# Patient Record
Sex: MALE | Race: WHITE | NOT HISPANIC OR LATINO | Employment: UNEMPLOYED | URBAN - METROPOLITAN AREA
[De-identification: names, ages, dates, MRNs, and addresses within clinical notes are randomized per-mention and may not be internally consistent; named-entity substitution may affect disease eponyms.]

---

## 2022-03-03 ENCOUNTER — TELEPHONE (OUTPATIENT)
Dept: PSYCHIATRY | Facility: CLINIC | Age: 32
End: 2022-03-03

## 2022-09-27 ENCOUNTER — OFFICE VISIT (OUTPATIENT)
Dept: PSYCHIATRY | Facility: CLINIC | Age: 32
End: 2022-09-27
Payer: COMMERCIAL

## 2022-09-27 DIAGNOSIS — F41.1 GENERALIZED ANXIETY DISORDER: Primary | ICD-10-CM

## 2022-10-12 ENCOUNTER — VIRTUAL VISIT (OUTPATIENT)
Dept: PSYCHIATRY | Facility: CLINIC | Age: 32
End: 2022-10-12
Payer: COMMERCIAL

## 2022-10-12 VITALS — WEIGHT: 150 LBS

## 2022-10-12 DIAGNOSIS — F33.2 MDD (MAJOR DEPRESSIVE DISORDER), RECURRENT EPISODE, SEVERE (H): ICD-10-CM

## 2022-10-12 DIAGNOSIS — F41.1 GENERALIZED ANXIETY DISORDER: Primary | ICD-10-CM

## 2022-10-12 RX ORDER — CLONAZEPAM 0.5 MG/1
TABLET ORAL PRN
COMMUNITY
Start: 2022-01-31

## 2022-10-12 RX ORDER — FLUOXETINE 40 MG/1
80 CAPSULE ORAL DAILY
COMMUNITY
Start: 2022-06-09

## 2022-10-12 ASSESSMENT — PATIENT HEALTH QUESTIONNAIRE - PHQ9
SUM OF ALL RESPONSES TO PHQ QUESTIONS 1-9: 8
SUM OF ALL RESPONSES TO PHQ QUESTIONS 1-9: 8
10. IF YOU CHECKED OFF ANY PROBLEMS, HOW DIFFICULT HAVE THESE PROBLEMS MADE IT FOR YOU TO DO YOUR WORK, TAKE CARE OF THINGS AT HOME, OR GET ALONG WITH OTHER PEOPLE: SOMEWHAT DIFFICULT

## 2022-10-12 ASSESSMENT — PAIN SCALES - GENERAL: PAINLEVEL: NO PAIN (0)

## 2022-10-12 NOTE — PROGRESS NOTES
"[facility] Treatment Resistant Depression Program  Diagnostic Assessment  A part of the [facility] Psychiatry Mood Disorders Program    Oscar Dunn MRN# [id number]   Age: 32 year old YOB: 1990     Date of Evaluation: 9/27/22  Start Time: 1:00; End Time: 2:15           Care Team     PCP- Marcus Reid  Specialty Providers- no  Therapist- Cassie Mejia  Psychiatric Med Management Provider- Montrell Benson at [facility]  Other Mental Health Providers- no    Referred by:  Self  Referred for evaluation of:  OCD         Contributors to the Assessment     Chart Reviewed.   Interview completed with Oscar Dunn.  Releases of information signed?  no  Collateral information obtained? no           Chief Complaint     \"most decisions are made with and by OCD\"         History of Present Illness      Oscar Dunn is a 32 year old male who goes by Oscar and uses he, him, his pronouns.    Oscar reports OCD symptoms starting at age 7 or 8 with rituals related to health. He notes that this started \"after a bad flu.\"    Oscar reports some symptoms of depression that are related to severity of OCD symptoms.    Current psychosocial stressors discussed include not working right now after a contract ended     Discussed other mental health concerns apart from depression, including anxiety, depression    Psych critical item history includes occasional passive SI         Psychiatric Review of Systems (Completed M.I.N.I. Version 7.0.2     A. DEPRESSION  Past 2 Weeks:  none    Past Episode:  low mood nearly every day, anhedonia most of the time, difficulties with sleep, psychomotor changes (agitation), low energy, worthlessness and/or guilt, difficulty concentrating, thinking or making decisions and suicidal ideation without plan, without intent    B. SUICIDALITY: Current: No, risk Low  -reports 0 lifetime suicide attempts  -reports 0% in response to \"How likely are to you to try to kill yourself within the next 3 months on a " "scale from 0-100%?\"  -denies current SI, denies plan and denies intent  -denies current SIB/Self Injurious Behavior    C. NOAH/HYPOMANIA  Current Episode:  none    Past Episode:  none    D. PANIC:  Oscar reports he always feels some level of anxiety and notes that in high school and college the severity ramped up to panic-level with panic attacks    E. AGORAPHOBIA:  marked fear/anxiety in traveling by bus, train, car or using public transportation because of fear that escape might be difficult or help might not be available;, at a level out of proportion to the actual danger posed, lasting 6 months or more, causing clinically significant distress or impairement in social, occupation, or other important areas of functioning  and Oscar notes that being in unfamiliar places is hard because they are \"not a safe spot\"    F. SOCIAL ANXIETY:  marked fear/anxiety in participating in small groups, attending parties and any situation where he is seeing someone he hasn't seen for a while or someone he is less familiar with than close friends and family. Oscar identifies a great deal of anticipatory anxiety for most social situations out of fear that he/she will act in a way or show anxiety symptoms that will be negatively evaluated, almost always, with active avoidance, a  or are endured with intense anxiety/fear, at a level out of proportion to the actual danger posed, lasting 6 months or more and causing clinically significant distress or impairement in social, occupation, or other important areas of functioning     G. OBSESSIVE-COMPULSIVE:  see YBOCS scores in flowseets. Oscar didn't no complete the symptom list as he was concerned about being triggered by content; however, he notes that OCD sx are moslty centered around health (vomitting, blood borne illness), contamination, and getting into trouble for something. He also notes some harm OCD and \"flavor of the week\" obsessions as well    H. TRAUMA:  experienced traumatic " "event, persistent avoidance of recollections of trauma and hypervigilence in all circumstances. Oscar reports that he has \"awlays been hypervigilant\" and is always scanning where he is for triggers and \"things to avoid\"    I. ALCOHOL & J. NON-ALCOHOL:  See below    K. PSYCHOSIS:   none    L-M. EATING DISORDER: did not assess due to time    N. GENERALIZED ANXIETY:  excessive anxiety or worry about several routine things, most days, with difficulty controlling worry, feel restless, keyed up or on edge, muscle tension, easily tired, weak or exhausted, difficulty concentrating or mind goes blank, irritability and difficulty sleeping    O. RULE OUT MEDICAL, ORGANIC OR DRUG CAUSES FOR ALL DISORDERS  During any current disorder or past mood episode, patient reports:  A. Substance use or withdrawal: No  B. Medical illness: Oscar reports that his OCD symptoms started \"after a bad flu\"    P. ANTISOCIAL PERSONALITY:  none     Other Cluster B Traits Identified (not formally assessed):  none discussed    SUBSTANCE USE HISTORY                                                                 RECENT SUBSTANCE USE:     TOBACCO- none     CAFFEINE- 1-2 cups/day of coffee   ALCOHOL- 4-5 drink(s) per week     CANNABIS- none            OTHER ILLICIT DRUGS- none    Past Use-   TOBACCO- none     CAFFEINE- 1-2 cups/day of coffee   ALCOHOL- 4-5 drink(s) per week     CANNABIS- none            OTHER ILLICIT DRUGS- none    CD Treatment Hx: No  Medical Consequences (eg HIV/Hepatitis)- No  Legal Consequences- No     PSYCHIATRIC HISTORY     Past diagnoses: OCD, GD    Past medication trials: multiple trials    Hospitalizations: none     Commitment: No, Current Navarro order: No    ECT trials: No    TMS trials:  Yes - recently completed a TMS course at [facility] and reports improvement in OCD symptoms       Ketamine:  No    Suicide attempts: No    Self-injurious behavior: No    Violent behavior: No    Outpatient Programs & Services [Psychotherapy, DBT, Day " "Treatment, Eating Disorder Tx etc]:   Current:  Medication management and Outpatient individual psychotherapy    Past:  Medication management, Outpatient individual psychotherapy and IOP at [facility] (3/202) - virtual, approx one month, PHP at [facility] (fall 2021) - in person for one week and then stopped because he was \"not in the right mindset\" and was too distracted by other participants    SOCIAL and FAMILY HISTORY                                                             Living situation: Oscar lives with alone, in a Private Residence.   Guns, weapons, or other means to harm oneself in the home? No  Pets at home? No     Education: Oscar s highest level of education is college graduate    Occupation: Oscar just finished a contract  job. 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 He feels supported and seeb by all of them.     Spiritual considerations: No    Cultural influences: Oscar identifies is race as white. Oscar reports no cultural considerations to take into account when providing treatment.     Gender identity:  Oscar identifies as male and uses he/him/his pronouns.    Strengths & Coping Strategies:  Oscar is bright, educated, and has professional skills that allow him to do contract work. He is personable and easy to engage. He knowledgeable about OCD and has engaged in treatment since he was quite young. He has friends and family whom he trusts and who support him, and he has stable housing, transportation, and income sufficient to support himself.    Legal Hx: No    Trauma/Abuse Hx: Yes - Oscar identifies 'small 't' traumas' that have contributed to hypervigilance and anxiety in most environments     Hx: " "No    Family Mental Health Hx- anxiety and depression on maternal and paternal sides. His dad has \"OCD tendencies\" but not an OCD diagnosis. A paternal cousin may have OCD    PAST PSYCH MED TRIALS      Will be reviewed during MTM.    MEDICAL / SURGICAL HISTORY                                   There is no problem list on file for this patient.      No past surgical history on file.     History of seizures: none reported  History of head trauma/loss of consciousness: none reported    ALLERGY                                Patient has no allergy information on record.    MEDICATIONS                               No current outpatient medications on file.       VITALS                                                                                                                            3, 3   There were no vitals taken for this visit.     MENTAL STATUS EXAM                                                                                    9, 14 cog gs     Alertness: alert  and oriented  Appearance: well groomed  Behavior/Demeanor: cooperative, pleasant and calm, with good  eye contact   Speech: normal  Language: intact  Psychomotor: restless  Mood: description consistent with euthymia  Affect: full range; was congruent to mood; was congruent to content  Thought Process/Associations: unremarkable  Thought Content:  Reports obsessions, pre-occupations Denies suicidal and violent ideation, delusions, magical thinking, over-valued ideas and paranoid ideation  Perception:  Reports none;  Denies auditory hallucinations and visual hallucinations  Insight: good  Judgment: good  Cognition: does  appear grossly intact; formal cognitive testing was not done    PSYCHIATRIC DIAGNOSES                                                                                               Generalized anxiety disorder   Social anxiety disorder  Obsessive-compulsive disorder, by history     ASSESSMENT                                            " "                                                              m2, h3     Please note, writer did not receive all pertinent medical records as of the time of this assessment. Oscar did sign NAN's for additional records.     Oscar Dunn is a 32 year old single (never )  male with a psychiatric history of OCD, HERMELINDO who presents for a [facility] Treatment Resistant Depression program evaluation. Oscar was referred by himself. He has a history of no psychiatric hospitalization.  Family mental health history includes depression, anxiety.     Oscar's anxiety symptoms started before he was 7 years old and he started rituals at the age of 7 or 8, after having \"a bad flu.\"     Today, Oscar presents as a Good historian with Good insight. He estimates short intermittent episodes of depression that seem connected to the severity of OCD symptoms. Oscar s past and present anxiety symptoms seem consistent with a diagnosis of generalized anxiety disorder. He has a diagnosis of OCD from his current and past mental health providers. OCD symptoms seem to contribute to impaired functioning in the areas of family / partner relationships , occupational performance and emotional wellbeing . Precipitating factors seem to include some family history of anxiety and depression and being ill with \"a bad flu.\" Perpetuating factors may consist of multiple treatment modalilties that have not resulted in resolution of symptoms . Past treatment approaches include medication, individual therapy, IOP, and a week of PHP. Oscar is presently participating in individual therapy and medication management.    Substance use does not seem to be a current problem. Further diagnostic clarification is not needed to rule out additional diagnoses.     Basic needs (food, housing, transportation, safety, income stability): basic needs met    Today, Oscar denies SI, denies a plan, and denies intent. He has notable risk factors for self-harm including severe anxiety " and male.  However, risk is mitigated by no h/o suicide attempt, no plan or intent, no h/o risky impulsive behavior, h/o seeking help when needed, future oriented and good social support  .  Based on all available evidence he does not appear to be at imminent risk for self-harm therefore does not meet criteria for a 72-hr hold/  involuntary hospitalization. Additional steps to minimize risk include: discussing safety plan, including people to reach out to, crisis numbers and texts, and EmPATH.  24/7 crisis resources were provided verbally.     PLAN                                                                                                                        m2, h3   Next steps are as follows with intention of completing a comprehensive multi-disciplinary assessment utilizing today's evaluation, the expertise of a PharmD, as well as a Psychiatrist. Informed Oscar that if deemed appropriate for Interventional Psychiatry treatments, care will be provided with goal of stabilization with subsequent transfer back to the community (I.e. PCP or previous psychiatrist).    Medications: Will be addressed during an MTM visit and new patient medication evaluation with a Psychiatrist.     Therapy:  Yes - continue with current therapist    Crisis Resources provided:  24/7 crisis resources including but not limited to the following:   - National Suicide Prevention Hotline: 8-411-196-TALK (5107)   - Crisis Text Line: Text START to 669-032   - EmPATH at [facility]    Other Referrals:  No    RTC: For MTM visit.    JONNIE Penny

## 2022-10-12 NOTE — PROGRESS NOTES
Oscar is a 32 year old who is being evaluated via a billable video visit.      How would you like to obtain your AVS? Mail a copy  If the video visit is dropped, the invitation should be resent by: Text to cell phone: [phone]  Will anyone else be joining your video visit? No      Video-Visit Details    Video Start Time: 1:00 pm    Type of service:  Video Visit    Video End Time:2:04 pm    Originating Location (pt. Location):Car/ home    Distant Location (provider location):  [facility] PSYCHIATRY     Platform used for Video Visit: [facility] [place]     Medication and allergies have been reviewed.       Saeid Chapin, NIESHA      Answers for HPI/ROS submitted by the patient on 10/12/2022  If you checked off any problems, how difficult have these problems made it for you to do your work, take care of things at home, or get along with other people?: Somewhat difficult  PHQ9 TOTAL SCORE: 8

## 2022-11-03 NOTE — PROGRESS NOTES
Service Date: 10/12/2022     PHYSICIAN TRD PROGRAM MEDICATION THERAPY MANAGEMENT    This was a video visit via [employer] from my home to the patient's car, and when the connection disrupted, we redid the meeting when the patient arrived home and we spoke from his home.  In the car, we did it through [employer], and then in the patient's home, we used his email, [email or web address].  Patient's [phone].  This was used for the connection with [employer].  My student, Kristi Singh, was present for observation and training purposes with the patient's permission.    Starting time 1 p.m.  Ending time 2:04 p.m.    DICTATION IDENTIFIERS:  NAME:  Oscar Dunn.  YOB: 1990   VISIT DATE: 10/12/2022.    The patient is a [facility] patient.    IDENTIFYING INFORMATION AND INTRODUCTION:  Oscar is a 32-year-old prior  at , currently unemployed.  He was seen on a video visit today for an  Physician TRD [facility] consultation.    He lives in [place], [place].    The patient is a new adult to the  Physician TRD Program.    Psychiatrist is Dr. Sadiq Russell and he will see him in person on 11/18/2022.    CHIEF COMPLAINTS:  Depression, anxiety, OCD.    REASON FOR CONSULTATION:  Rating medication trials for antidepressant failure and assessment of antidepressant drugs and their history.      Informants include the patient and review of past medical records.  Please be aware that at time of visit, I was not in the possession of all of his past medical mental records.    Time spent was 45 minutes without the patient and 1 hour with the patient.    MEDICATION INFORMATION:      1.  Current Psychotropic Medications:    A.  Prozac/fluoxetine 80 mg every a.m.  Indication:  Depression, OCD.    B.  Clonazepam 0.5 mg tablet, patient takes half to one tablet a day.  Most of the time, he might take a second tablet in the middle of the night, so he might have extra 2 clonazepam per week.    2.  Concomitant  "Medications:    A.  Acetaminophen p.r.n., very rare.  B.  Tums p.r.n., very rare.    3.  Herbal Remedies:    A. Multivitamin once a day.    4.  Drug/Drug Interactions:   A.  Clonazepam and ethanol may result in increased sedation.    5.  Current Reported Side Effects:  None.    6.  Gene Testing: Was done.  The patient will send it.    7.  Allergies:  No known drug allergies.    PAST MEDICAL HISTORY:    1.  OCD, anxiety and depression.  2.  GERD.  3.  History of ulcer.    DEPRESSION HISTORY:    1.  Patient had anxiety as far as he can remember and OCD started around age 8.    2.  Depression, OCD history:  In 2009 in his freshman year of college, the patient was diagnosed with OCD around age [age]. Depression he has since age 15 or 16.   3.  First time antidepressant started:  At age 19.  It was Prozac.    Last episode started in 08/2021.  When he changed over to Luvox 300 mg per day from Prozac, \"all hell broke loose.\"  They also ended olanzapine and it was changed to Respirdal.  In the winter of 2020, he had severe depression because he was also laid off before from work. Before he was changed over to Luvox, he was on Prozac and clonazepam and actually did relatively well.    4.  Hospitalization for suicidal ideation or suicide attempt:  Denied.  The patient had 1 ER visit in 01/2022.    5.  Treatment history:  In 05/2020, he was laid off work.  He ended up at [facility] treatment.  Had partial hospitalization also in 2022 and that was not very good.  He could not get along with his counselor.    6.  Suicidal ideation currently:  Passive.  7.  Individual psychotherapy:  He has 2 per week, and it helps.  8.  CBT:  Yes.  Effective:  Yes.  9.  DBT:  Yes, it is incorporated a little bit.  10.  The patient had little exposure work. \" ERP he does not like it\".    SOCIAL AND ENVIRONMENTAL ASPECTS:  The patient is living alone.    PHARMACOTHERAPY INDICATORS:      1.  The patient has [place] prescription " coverage.  Prescription drug co-payment is manageable.  2.  The cost of obtaining prescribed medications does not interfere with compliance.  3.  The patient's pharmacy is [facility] at [place].    4.  Compliance Assessment:  Patient is independent in medication administration.  He is a good historian.  He does not use a pillbox and he misses medication maybe once or twice per month.  I asked him what happens when the depression gets really severe, he says he will try to be with friends or will do things    5.  Habits and Chemical Use:  A. Alcohol:  Currently using 4 beers per week.  B. Tobacco:  Never.  C.  Caffeine:  One to two cups per day.  D.  Recreational drugs: Tried some cannabis.  E.  Exercise:  He walks a lot, lifts weight and he is not running any more like before.    F.  Hobbies:  Friends, being out, reading, hiking, exploring new information.    RATING OF ANTIDEPRESSANT DRUGS:  Antidepressant treatment history using antidepressant resistant rating.    1.  Selective serotonin reuptake inhibitors (SSRIs):    A.  Citalopram/Celexa was tried in college.  B.  Fluoxetine/Prozac:  It was used off and on.  He would always go back to the Prozac. Started in 2009, I saw it between 2017 and present on and off.  Max dose 80 mg per day.  It is working somewhat .  Side effects of fatigue.  Would give it a rating of 4.  C.  Fluvoxamine/Luvox was tried 07/2021 until 12/2021.  Max dose 300 mg per day.  It was ineffective and he was not functioning while he was using it. 300 mg per day would give it a rating of 4.    2.  Serotonin norepinephrine reuptake inhibitors (SNRIs) Negative.    3.  Serotonin modulators and stimulators:  Negative.    4. Noradrenaline and dopamine reuptake inhibitors (NDRIs):  Negative.    5.  Tricyclic antidepressants (TCAs):  Negative.    6.  Tetracyclic antidepressants:  Negative.    7.  Monoamine oxidase inhibitors (MAOIs):  Negative.    8.  Augmentation therapy:  --  Antipsychotics:  A.  Olanzapine/Zyprexa 5 mg between  and 2020 together with Luvox.  They tried it for sleep, anxiety and racing thoughts. Did not work and he had increase in weight.  B.  Risperdal in 2022 to 2022 up to 1 mg per day, caused tiredness.    -- Stimulants:  Negative.    -- Benzodiazepines:  A.  Alprazolam/Xanax only used for anxiety while flying.  B.  Clonazepam/Klonopin up to 1.5 mg between  and present for anxiety on and off.  C. Lorazepam/Ativan p.r.n.    9.  Miscellaneous: Negative.    10.  Miscellaneous sleep aids:    A.  Clonazepam was tried.    11.  Ketamine treatment history:  Negative.    12.  Other reported treatments for depression and related mood disorder:  A.  TMS: The first one was successful end of 2022 until 2022.  He had 36 applications and +5 after approval.  B.  ECT:  Negative.  C.  VNS:  Negative.  D.  Bright lights:  Negative.  E. CPAP:  Negative.    COMMENTS:    1.  The patient will follow up with MTM as needed.  2.  All MTM findings will be shared with clinic psychiatrist.  3.  The patient was instructed to return for upcoming appointment with Dr. Russell for recommendation and future treatment options.    Thank you for the opportunity to care for this patient.    Agata Ding, PharmD  Pharmaceutical Care Coordinator    Agata Ding, PharmD        D: 2022   T: 2022   MT: tyree    Name:     EULALIA DUMONT  MRN:      [id number]        Account:      [id number]   :      1990           Service Date: 10/12/2022       Document: [id number]

## 2022-11-18 ENCOUNTER — OFFICE VISIT (OUTPATIENT)
Dept: PSYCHIATRY | Facility: CLINIC | Age: 32
End: 2022-11-18
Payer: COMMERCIAL

## 2022-11-18 VITALS
TEMPERATURE: 98 F | DIASTOLIC BLOOD PRESSURE: 83 MMHG | HEART RATE: 81 BPM | SYSTOLIC BLOOD PRESSURE: 125 MMHG | HEIGHT: 74 IN | WEIGHT: 151.4 LBS | BODY MASS INDEX: 19.43 KG/M2

## 2022-11-18 DIAGNOSIS — F42.9 OBSESSIVE-COMPULSIVE DISORDER, UNSPECIFIED TYPE: Primary | ICD-10-CM

## 2022-11-18 ASSESSMENT — PATIENT HEALTH QUESTIONNAIRE - PHQ9: SUM OF ALL RESPONSES TO PHQ QUESTIONS 1-9: 8

## 2022-11-18 NOTE — PROGRESS NOTES
" [facility]  [address], [address]  [place] [address]  New Patient Evaluation       Oscar Dunn is a 32 year old male who prefers the name Oscar and pronoun he, him, his.  Psychiatrist: Montrell Benson PA-C, at [facility]  Therapist: Cassie Mejia, weekly  PCP: Marcus Reid  Referred by self for evaluation of OCD.     History was provided by patient who was a good historian.      Chief Complaint                                                                                                       OCD consult     History of Present Illness                                                                                4, 4      Pertinent Background and Present Symptoms:    Primary obsessions:   - OCD symptoms starting at age 7/8 with rituals related to health. This began after \"a bad flu\". Symptoms typically centered on health (vomiting, blood borne/sexual illness, contamination) and getting into trouble.    He finds himself worrying about contricting illnesses such as hepatitis and HIV.  At times he is \"deathly afraid of\" others.       Has also reported some ujpu-[id number]/getting in trouble concerns. In college he had kxvq-hq-xhdqqa OCD and had to move out of his room due to fear of possibly harming his roommate. Specifically he feared he would stab or shoot others.         Primary compulsive behaviors:  Some undoing:  Saying \"negative\" when hear COVID. Wanting to have a good thought when turning off the shower.      Most of his rituals are avoidance:  He will not stay at hotel with the wrong numbers on the address.   Would throw out clothes if worn in a very contaminated situation.  Has had numerous friends/acquaintances who he avoided bc they once said something that gave him a contamination thought.  Has difficulty even naming his specific obsessions due to fears of causing them to come true.    History of washing/decontamination:  He would wash and/or get rid of his " "clothes or wash multiple times to feel better.     Long showers when he was younger; up to 3am as he would have to turn the shower back on when he had a specific thought.     He has checking behaviors while driving, specifically circling the street multiple times to make sure that he has not hit someone.         Exposure work:  Reports he is some resistant to doing the ERP work because \"I am floating by\". He wants to do the work to prevent further relapse, has impacted relationships, schooling, and \"would have been much farther along\".     Did some relaxation work for anxiety with [facility] in 2022, but not exposure-based.    He completed outpatient (virtual) and PHP (only 1 week) with Michael, in 2020 and 2001, respectively. He felt this was some helpful but stopped pursuing this when he got a job. He dropped out last fall when he did not feel that the provider was listening and was \"pushing too hard\".     He has also explored TMS for OCD, \"TMS - 5/2022 to 7/2022 at [facility] with improvement in OCD symptoms. 41 applications total\". Started with H1 coil for one week then transitioned to an H-coil. He feels his SUDS score was between 4-5 as a result of him going on his phone to seek out stimulation. No YBOCS score.     In mid-2022, he reported his sx being in better control, s.t. he was able to reduce medications substantially.    He does not like taking antipsychotics long-term and has been using these only when \"reallly bad\".         Psychiatric Review of Symptoms:   Depression: He has endorsed deprssive symptoms, driven primarily by periods when his OCD worsens.    There may also be some agoraphobia:  - Out of proportion fear/anxiety when traveling in vehicles due to fear of difficulty escaping. Causing significant distress and impairment  - Fear of unfamiliar places due to safety concerns.     Brittney: Negative  Psychosis: Negative   Homicidal Ideation/Violence/Aggression: Negative         Recent Substance " "Use:  Alcohol- 4 beers per week  Tobacco- Never used   Caffeine- 1-2 cups of coffee daily  Opioids- no      Narcan Kit- N/A   Cannabis- no    Other Illicit Drugs-none         Substance Use History     Past Use- Alcohol- sometimes enough to reach inappropriate behavior, but rarely.    Tobacco- no , Caffeine- coffee/ tea [1-2 cups per day],   Opioids- not asked     Cannabis- yes   and Other Illicit Drugs-none   Treatment [#, most recent]- None    Medical Consequences [withdrawal, sz etc]- None   HIV/Hepatitis- SIB- None    Legal Consequences- None       Psychiatric History   MDD- onset age [age]. Started medication (fluoxetine) at age 19.   OCD- symptoms since age 7/8, diagnosed at age 18-19.   HERMELINDO-  Anxiety dx in 4/2016      Suicidal ideation- Intermittent passive SI   Suicide Attempt:   #- 0   Most Recent- NA  SIB- None   Psych Hosp- None          Psychiatric Medication Trials       Adequate dose and duration  Fluvoxamine 300 mg, 7/2021 to 12/2021, ineffective (worsened OCD)      Limited by side effects  Fluoxetine 80 mg, 2009 to current, fatigue    Inadequate dose/duration    Information not confirmable  Citalopram \"tried in college\".      Augmentation  Olanzapine 5 mg, 01/2020 to 2021, lack of efficacy and weight gain  Risperidone 1 mg, 04/2022 to 05/2022, tiredness (and stopped to reduce side effects)  Aripiprazole 2 mg in 2/2019-8/2019, stopped mainly bc patient did not want to be on as much medication.    Clonazepam up to 1.5 mg, 2009 to current; currently using 0.25mg daily prn with very high anxiety attacks      Other  Alprazolam, for flight anxiety  Lorazepam, used PRN         Social/ Family History               [per patient report]                                                 1ea, 1ea     FINANCIAL SUPPORT-  Prior  at [employer]  and . Currently unemployed  RELATIONSHIPS/CHILDREN- None       LIVING SITUATION- Lives alone in [place], [place] .   LEGAL- " "None  EARLY HISTORY/ EDUCATION- Bachelors  SOCIAL/ SPIRITUAL SUPPORT- Close with parents and friends that are supportive. Walks often, lifts weights.  Enjoys reading, hiking, learning, going out.    CULTURAL INFLUENCES/ IMPACT- Identifies as white. No cultural considerations for treatment reported.   TRAUMA HISTORY (self-report)- Several \"small t traumas\" contributing to hypervigilance and anxiety  FAMILY HISTORY-   Father: \"OCD tendencies\" but no dx OCD.   Paternal cousin: Possible OCD  Younger brother: Seizure disorder  Anxiety and depression on maternal and paternal sides        Medical / Surgical History   There is no problem list on file for this patient.      No past surgical history on file.      Medical Review of Systems                                                                                                 2, 10     Not done    Metals Screen   Not done as has actually had TMS recently.         Allergy   Patient has no known allergies.     Current Medications     Current Outpatient Medications   Medication Sig Dispense Refill     clonazePAM (KLONOPIN) 0.5 MG tablet as needed       FLUoxetine (PROZAC) 40 MG capsule Take 80 mg by mouth daily       MULTIPLE VITAMINS PO           Vitals                                                                                                                        3, 3     /83   Pulse 81   Temp 98  F (36.7  C) (Temporal)   Ht 1.88 m (6' 2\")   Wt 68.7 kg (151 lb 6.4 oz)   BMI 19.44 kg/m        Mental Status Exam                                                                                   9, 14 cog gs     Alertness: alert  and oriented  Appearance: well groomed  Behavior/Demeanor: cooperative, pleasant and calm, with good  eye contact   Speech: normal and regular rate and rhythm  Language: intact  Psychomotor: normal or unremarkable  Mood: \"ok\"  Affect: euthymic; was congruent to mood; was congruent to content  Thought Process/Associations: " unremarkable  Thought Content:  Reports  obsessions;  Denies suicidal and violent ideation  Perception:  Reports none;  Denies auditory hallucinations and visual hallucinations  Insight: good  Judgment: good  Cognition: (6) grossly intact  Gait and Station: unremarkable     Labs and Data     Rating Scales:    none needed    YBOCS Today: 23 (11 O, 12 C) on 9/27/22 by Cookie Alexis  PHQ9 Today:    PHQ 10/12/2022 11/18/2022   PHQ-9 Total Score 8 8   Q9: Thoughts of better off dead/self-harm past 2 weeks Not at all Not at all         No lab results found.  No lab results found.    Diagnosis and Assessment                                                                             m2, h3     Oscar Dunn is a 32 year old male with previous diagnosis of OCD and MDD who presents for a diagnostic opinion and discussion of therapeutic options.     Diagnostically, this does indeed sound like canonical OCD, based both on the symptom pattern and the treatment response. Regarding medication trials, I really do not think we can frame this as TR-OCD. First, the trials have been basically limtied to fluoxetine and fluvoxamine. The former has never been at the 100mg level. Sertraline, citalopram, and other more tolerable serotonergics have not been tried at high doses, nor has wellbutrin or stimulant been used to mininize fatigue effects.I also have a sense, from reading the notes, that he actually does well when taking SSRI plus D2 antagonist, but tends to want to stop the D2 antagonist when doing well, which leads to doing not well.    Exposure therapy appears to have been conducted by a well trained therapist, but with inconsistent engagement. At times, he has done the work. He does struggle at times to push himself to continue to engage in frequent and triggering ERP work which would be of further benefit. I definitely think he is trying, but probably could stand to intensify.    Based on the above, neuromodulation or neurosurgical  therapies are not indicated at this time.   He has already had trial of TMS with appropriate protocol that involved him getting triggered with OCD thoughts prior to treatment. He had good response to TMS yet due to recency would not recommend he pursue this again at this time.      The risks, benefits, alternatives and potential adverse effects of the above have been explained and are understood by the patient. Our next steps are to pursue further increasing / maximizing ERP work and medications as listed below. .  Oscar Dunn agrees to the treatment plan with the ability to do so. The pt knows to call the clinic for non-acute problems, but to access emergency resources in case of a crisis.  Medical considerations relevant to treatment are: none  Substance concerns relevant to treatment are: none      Suicide Risk Assessment:  Today Oscar Dunn reports no suicidal ideation. In addition, he has notable risk factors for self-harm, including none. However, risk is mitigated by no h/o suicide attempt, no plan or intent, h/o seeking help when needed, future oriented, feeling hopeful and good social support  . Therefore, based on all available evidence including the factors cited above, he does not appear to be at imminent risk for self-harm, does not meet criteria for a 72-hr hold, and therefore involuntary hospitalization will not be pursued at this  time.     [place] Prescription Monitoring Program [] review was not needed today.    PSYCHOTROPIC DRUG INTERACTIONS: none clinically relevant    Plan                                                                                                                     m2, h3       1) Obsessive-compulsive disorder  -- Medications:    He has not fully maximized medications to treat OCD, as doses are typically higher than commonly prescribed for other diagnoses.  Suggestions include (in rough order)    Increasing Prozac, up to 120mg daily as tolerated    Zoloft, up to 400mg daily  as tolerated    Trial of a stimulant in lieu of an antipsychotic    Clomipramine to blood level 250 - 450 ng/ml (around 200mg to 250mg daily)    Critically, he really does need to make a change. He is reluctant to do so, but if he does not, the chance of achieving his potential improvement is much lower.    -- Psychotherapy:    Recommended he continue ERP work and continue to push himself to engage in triggering exposures with regularity.    -- Procedures:    - None at this time as he is treatment responsive OCD  -- Referrals: None    RTC: If needed; happy to see him back in a year or so or when he runs off the end of the medication algorithm.        CRISIS NUMBERS:   Provided routinely in AVS.    Treatment Risk Statement:  The patient understands the risks, benefits, adverse effects and alternatives. Agrees to treatment with the capacity to do so. No medical contraindications to treatment. Agrees to call clinic for any problems. The patient understands to call 911 or go to the nearest ED if life threatening or urgent symptoms occur.        PROVIDER:  Sadiq Russell MD    Time based billing.  On the day of the visit:  45 minutes spent reviewing chart, past notes, prior treatments.  60 minutes spent face to face with patient.  30 minutes spent preparing consult note, additional communications.      Physician Attestation   I, Sadiq Russell MD, saw this patient and agree with the findings and plan of care as documented in the note.      Items personally reviewed/procedural attestation: I was present for and supervised the entire  procedure.    Saidq Russell MD

## 2022-11-18 NOTE — LETTER
"11/18/2022       RE: Oscar Dunn  [address]  [facility] [address]     Dear Colleague,    Thank you for referring your patient, Oscar Dunn, to the P PSYCHIATRY at [facility]. Please see a copy of my visit note below.     [facility] TMS [facility]  [address], [address]  [place] [address]  New Patient Evaluation       Oscar Dunn is a 32 year old male who prefers the name Oscar and pronoun he, him, his.  Psychiatrist: Montrell Benson PA-C, at [facility]  Therapist: Cassie Mejia, weekly  PCP: Marcus Reid  Referred by self for evaluation of OCD.     History was provided by patient who was a good historian.      Chief Complaint                                                                                                       OCD consult     History of Present Illness                                                                                4, 4      Pertinent Background and Present Symptoms:    Primary obsessions:   - OCD symptoms starting at age 7/8 with rituals related to health. This began after \"a bad flu\". Symptoms typically centered on health (vomiting, blood borne/sexual illness, contamination) and getting into trouble.    He finds himself worrying about contricting illnesses such as hepatitis and HIV.  At times he is \"deathly afraid of\" others.       Has also reported some cucb-fz-oigpax/getting in trouble concerns. In college he had ngkk-fc-[id number] OCD and had to move out of his room due to fear of possibly harming his roommate. Specifically he feared he would stab or shoot others.         Primary compulsive behaviors:  Some undoing:  Saying \"negative\" when hear COVID. Wanting to have a good thought when turning off the shower.      Most of his rituals are avoidance:  He will not stay at hotel with the wrong numbers on the address.   Would throw out clothes if worn in a very contaminated situation.  Has had " "numerous friends/acquaintances who he avoided bc they once said something that gave him a contamination thought.  Has difficulty even naming his specific obsessions due to fears of causing them to come true.    History of washing/decontamination:  He would wash and/or get rid of his clothes or wash multiple times to feel better.     Long showers when he was younger; up to 3am as he would have to turn the shower back on when he had a specific thought.     He has checking behaviors while driving, specifically circling the street multiple times to make sure that he has not hit someone.         Exposure work:  Reports he is some resistant to doing the ERP work because \"I am floating by\". He wants to do the work to prevent further relapse, has impacted relationships, schooling, and \"would have been much farther along\".     Did some relaxation work for anxiety with [facility] in 2022, but not exposure-based.    He completed outpatient (virtual) and PHP (only 1 week) with Michael, in 2020 and 2001, respectively. He felt this was some helpful but stopped pursuing this when he got a job. He dropped out last fall when he did not feel that the provider was listening and was \"pushing too hard\".     He has also explored TMS for OCD, \"TMS - 5/2022 to 7/2022 at [facility] with improvement in OCD symptoms. 41 applications total\". Started with H1 coil for one week then transitioned to an H-coil. He feels his SUDS score was between 4-5 as a result of him going on his phone to seek out stimulation. No YBOCS score.     In mid-2022, he reported his sx being in better control, s.t. he was able to reduce medications substantially.    He does not like taking antipsychotics long-term and has been using these only when \"reallly bad\".         Psychiatric Review of Symptoms:   Depression: He has endorsed deprssive symptoms, driven primarily by periods when his OCD worsens.    There may also be some agoraphobia:  - Out of proportion fear/anxiety " "when traveling in vehicles due to fear of difficulty escaping. Causing significant distress and impairment  - Fear of unfamiliar places due to safety concerns.     Brittney: Negative  Psychosis: Negative   Homicidal Ideation/Violence/Aggression: Negative         Recent Substance Use:  Alcohol- 4 beers per week  Tobacco- Never used   Caffeine- 1-2 cups of coffee daily  Opioids- no      Narcan Kit- N/A   Cannabis- no    Other Illicit Drugs-none         Substance Use History     Past Use- Alcohol- sometimes enough to reach inappropriate behavior, but rarely.    Tobacco- no , Caffeine- coffee/ tea [1-2 cups per day],   Opioids- not asked     Cannabis- yes   and Other Illicit Drugs-none   Treatment [#, most recent]- None    Medical Consequences [withdrawal, sz etc]- None   HIV/Hepatitis- SIB- None    Legal Consequences- None       Psychiatric History   MDD- onset age [age]. Started medication (fluoxetine) at age 19.   OCD- symptoms since age 7/8, diagnosed at age 18-19.   HERMELINDO-  Anxiety dx in 4/2016      Suicidal ideation- Intermittent passive SI   Suicide Attempt:   #- 0   Most Recent- NA  SIB- None   Psych Hosp- None          Psychiatric Medication Trials       Adequate dose and duration  Fluvoxamine 300 mg, 7/2021 to 12/2021, ineffective (worsened OCD)      Limited by side effects  Fluoxetine 80 mg, 2009 to current, fatigue    Inadequate dose/duration    Information not confirmable  Citalopram \"tried in college\".      Augmentation  Olanzapine 5 mg, 01/2020 to 2021, lack of efficacy and weight gain  Risperidone 1 mg, 04/2022 to 05/2022, tiredness (and stopped to reduce side effects)  Aripiprazole 2 mg in 2/2019-8/2019, stopped mainly bc patient did not want to be on as much medication.    Clonazepam up to 1.5 mg, 2009 to current; currently using 0.25mg daily prn with very high anxiety attacks      Other  Alprazolam, for flight anxiety  Lorazepam, used PRN         Social/ Family History               [per patient report]    " "                                             1ea, 1ea     FINANCIAL SUPPORT-  Prior  at , [employer]  and . Currently unemployed  RELATIONSHIPS/CHILDREN- None       LIVING SITUATION- Lives alone in [place], [place] .   LEGAL- None  EARLY HISTORY/ EDUCATION- Bachelors  SOCIAL/ SPIRITUAL SUPPORT- Close with parents and friends that are supportive. Walks often, lifts weights.  Enjoys reading, hiking, learning, going out.    CULTURAL INFLUENCES/ IMPACT- Identifies as white. No cultural considerations for treatment reported.   TRAUMA HISTORY (self-report)- Several \"small t traumas\" contributing to hypervigilance and anxiety  FAMILY HISTORY-   Father: \"OCD tendencies\" but no dx OCD.   Paternal cousin: Possible OCD  Younger brother: Seizure disorder  Anxiety and depression on maternal and paternal sides        Medical / Surgical History   There is no problem list on file for this patient.      No past surgical history on file.      Medical Review of Systems                                                                                                 2, 10     Not done    Metals Screen   Not done as has actually had TMS recently.         Allergy   Patient has no known allergies.     Current Medications     Current Outpatient Medications   Medication Sig Dispense Refill     clonazePAM (KLONOPIN) 0.5 MG tablet as needed       FLUoxetine (PROZAC) 40 MG capsule Take 80 mg by mouth daily       MULTIPLE VITAMINS PO           Vitals                                                                                                                        3, 3     /83   Pulse 81   Temp 98  F (36.7  C) (Temporal)   Ht 1.88 m (6' 2\")   Wt 68.7 kg (151 lb 6.4 oz)   BMI 19.44 kg/m        Mental Status Exam                                                                                   9, 14 cog gs     Alertness: alert  and oriented  Appearance: well groomed  Behavior/Demeanor: " "cooperative, pleasant and calm, with good  eye contact   Speech: normal and regular rate and rhythm  Language: intact  Psychomotor: normal or unremarkable  Mood: \"ok\"  Affect: euthymic; was congruent to mood; was congruent to content  Thought Process/Associations: unremarkable  Thought Content:  Reports  obsessions;  Denies suicidal and violent ideation  Perception:  Reports none;  Denies auditory hallucinations and visual hallucinations  Insight: good  Judgment: good  Cognition: (6) grossly intact  Gait and Station: unremarkable     Labs and Data     Rating Scales:    none needed    YBOCS Today: 23 (11 O, 12 C) on 9/27/22 by Cookie Alexis  PHQ9 Today:    PHQ 10/12/2022 11/18/2022   PHQ-9 Total Score 8 8   Q9: Thoughts of better off dead/self-harm past 2 weeks Not at all Not at all         No lab results found.  No lab results found.    Diagnosis and Assessment                                                                             m2, h3     Oscar Dunn is a 32 year old male with previous diagnosis of OCD and MDD who presents for a diagnostic opinion and discussion of therapeutic options.     Diagnostically, this does indeed sound like canonical OCD, based both on the symptom pattern and the treatment response. Regarding medication trials, I really do not think we can frame this as TR-OCD. First, the trials have been basically limtied to fluoxetine and fluvoxamine. The former has never been at the 100mg level. Sertraline, citalopram, and other more tolerable serotonergics have not been tried at high doses, nor has wellbutrin or stimulant been used to mininize fatigue effects.I also have a sense, from reading the notes, that he actually does well when taking SSRI plus D2 antagonist, but tends to want to stop the D2 antagonist when doing well, which leads to doing not well.    Exposure therapy appears to have been conducted by a well trained therapist, but with inconsistent engagement. At times, he has done the " work. He does struggle at times to push himself to continue to engage in frequent and triggering ERP work which would be of further benefit. I definitely think he is trying, but probably could stand to intensify.    Based on the above, neuromodulation or neurosurgical therapies are not indicated at this time.   He has already had trial of TMS with appropriate protocol that involved him getting triggered with OCD thoughts prior to treatment. He had good response to TMS yet due to recency would not recommend he pursue this again at this time.      The risks, benefits, alternatives and potential adverse effects of the above have been explained and are understood by the patient. Our next steps are to pursue further increasing / maximizing ERP work and medications as listed below. .  Oscar Dunn agrees to the treatment plan with the ability to do so. The pt knows to call the clinic for non-acute problems, but to access emergency resources in case of a crisis.  Medical considerations relevant to treatment are: none  Substance concerns relevant to treatment are: none      Suicide Risk Assessment:  Today Oscar Dunn reports no suicidal ideation. In addition, he has notable risk factors for self-harm, including none. However, risk is mitigated by no h/o suicide attempt, no plan or intent, h/o seeking help when needed, future oriented, feeling hopeful and good social support  . Therefore, based on all available evidence including the factors cited above, he does not appear to be at imminent risk for self-harm, does not meet criteria for a 72-hr hold, and therefore involuntary hospitalization will not be pursued at this  time.     [place] Prescription Monitoring Program [] review was not needed today.    PSYCHOTROPIC DRUG INTERACTIONS: none clinically relevant    Plan                                                                                                                     m2, h3       1) Obsessive-compulsive  disorder  -- Medications:    He has not fully maximized medications to treat OCD, as doses are typically higher than commonly prescribed for other diagnoses.  Suggestions include (in rough order)    Increasing Prozac, up to 120mg daily as tolerated    Zoloft, up to 400mg daily as tolerated    Trial of a stimulant in lieu of an antipsychotic    Clomipramine to blood level 250 - 450 ng/ml (around 200mg to 250mg daily)    Critically, he really does need to make a change. He is reluctant to do so, but if he does not, the chance of achieving his potential improvement is much lower.    -- Psychotherapy:    Recommended he continue ERP work and continue to push himself to engage in triggering exposures with regularity.    -- Procedures:    - None at this time as he is treatment responsive OCD  -- Referrals: None    RTC: If needed; happy to see him back in a year or so or when he runs off the end of the medication algorithm.        CRISIS NUMBERS:   Provided routinely in AVS.    Treatment Risk Statement:  The patient understands the risks, benefits, adverse effects and alternatives. Agrees to treatment with the capacity to do so. No medical contraindications to treatment. Agrees to call clinic for any problems. The patient understands to call 911 or go to the nearest ED if life threatening or urgent symptoms occur.        PROVIDER:  Sadiq Russell MD    Time based billing.  On the day of the visit:  45 minutes spent reviewing chart, past notes, prior treatments.  60 minutes spent face to face with patient.  30 minutes spent preparing consult note, additional communications.      Physician Attestation   I, Sadiq Russell MD, saw this patient and agree with the findings and plan of care as documented in the note.      Items personally reviewed/procedural attestation: I was present for and supervised the entire  procedure.    Sadiq Russell MD      Again, thank you for allowing me to participate in the care of your patient.       Sincerely,    Sadiq Russell MD

## 2022-11-18 NOTE — PATIENT INSTRUCTIONS
"Today's Recommendations:  - Overall, I think your chances of response to aggressive medication/therapy treatment of OCD are VERY good. Getting that treatment will not be comfortable, but it likely will help.    - STAY IN EXPOSURE WORK. The med strategies below are evidence-based and reasonable, but, without exposure alongside them, they will do very little.    - The rough order of things to try is:   Raising the fluoxetine up to 100 or even 120mg.   Another selective serotonin reuptake inhibitor. I would particularly favor sertraline (Zoloft), often needs to be at very high levels.   Adding an augmenting agent, which could be a stimulant instead of an antipsychotic. If you get sedation, stimulants help. If you get sexual side effects, Wellbutrin often helps.   Clomipramine would be worth trying after that.    - Critically, if any of these gets you well, you will want to stay with it, with no changes or decreases, for at least a year before making any alterations. Your brain needs time to learn to live without OCD.    - I would set a specific time/date on which you will implement your first medicaton change. If you make that promise to yourself, you are more likely to follow through.    - I do think your symptoms would cycle less if you stayed long-term on an antipsychotic/anti-D2 agent. I recognize that there are reasons why you might not want to do that, and I consider that a cost-benefit analysis you get to make.    - Certainly advanced options like psilocybin can be considered down the road. I would not recommend those until you have been through the standard algorithm above.      We are specifically a university and research clinic, and there are often research studies ongoing. Some of those are clinical trials of new brain stimulation treatments. Others are what we would call \"biomarker\" studies, where we ask you to participate in some kind of measurement while you are undergoing regular treatment. You may be " "called by one of our clinical research coordinators about these studies. If you do not want to be contacted, please let us know. (Being called does not mean you have to be in a study.) In some cases, a clinical trial is the only way to get access to an advanced treatment that is not covered by your insurance. Usually, we will talk about this in your visit if it is an option.      Patient Education       General Information:  Our Treatment-Resistant Disorders/Interventional Psychiatry clinic is what is often called a \"consultation\" or \"tertiary care\" clinic. That means we do not see patients long-term for medication management or talk therapy. We offer thorough evaluations, recommendations about medications/therapies you may have not yet tried, and in some cases, brain stimulation or office-based treatments. If you are likely to benefit from one of those advanced treatments, we will have talked about it today. Once that treatment is complete, we will see you once or twice afterwards to check in, and then you will return to getting ongoing psychiatric care from whomever you were seeing before you came for your evaluation with us. If for some reason you no longer have access to that clinician, we can help with a referral to our main [facility]. The only patients we see long-term are patients with implanted medical devices that require ongoing monitoring and programming.     Our recommendations almost always include some kind of cognitive-behavioral therapy (CBT) if you are not getting it already. Brain and nerve stimulation treatments work best when combined with certain talk therapies. We make these recommendations because we strongly believe that, without the therapy piece, most people will not get better, or will have limited clinical benefit. It is often difficult or inconvenient to add therapy to an already busy schedule, but it is also necessary.    It is important to remember that, like all mental " health treatments, our interventional therapies are not perfect. About one third of people will not feel better after treatment, and even when they work, they do not take away the symptoms entirely.If we have recommended something above, it means we think there is a better than even chance of it working, but things are never guaranteed. This is another reason we usually recommend CBT along with our advanced treatments -- it can address the symptoms that remain after the stimulation/ketamine treatment.       While we are waiting to implement the recommendations you and I have discussed, you should know what to do if your symptoms worsen. A variety of crisis numbers/resources are available. They include:    CRISIS GENERAL NUMBERS   [phone] ([phone])  OR  911     CRISIS INTERVENTION TEAM (CIT) - this is a POLICE UNIT, specially trained.  This website has information for all of [place]'s CITs. Lays out which areas have this team.  [email or web address]  However, one can just call 911 and ask for this special team.   If police need to be called, DO ask for this team.    CRISIS MOBILE TEAMS  [and see end of this phrase*]  [facility] -COPE: 24hrs/7days:    [phone]  (Adults > 19yo)    [phone]  (Child   < 18yo)                                       FRONT DOOR (during the day could call)   [phone]    [facility] -[phone] (Adult)  [phone] (Child)     [facility] -[phone] (Adult and Child)     [facility] -[phone] (for; to (do) mobile crisis team; Adult and Child; 24hr)     [facility] -[phone] (Adult and Child)     CRISIS HOUSING  [facility]                           [address]              [phone]  [facility]                                [address]                       [phone]  [facility]                      "          [address]     [phone]   [facility]  [address]                [phone]    [facility] EMERGENCY NUMBERS    Crisis Connection:                                [phone]  [facility]:     [phone]  Crisis Intervention:                                [phone] or [phone]   COPE: Mobile Team 24hrs/7days:    [phone]  (Adults > 19yo)                                                                            [phone]  (Child   < 18yo)  [facility]        [phone] 24/7 line and Mobile Team   [address] [place] [address]  DROP IN:  M-F: 8:00 am - 7:00 pm  Sat: 11:00 am - 3:00 pm   Sun: Closed     WALK-IN COUNSELING:  [facility]       [phone]    [address]:   M, W, F:  1:00-3:00PM    M-Th:  6:30-8:30PM    TRANS and LGBT  Call [employer] at [phone]. This service is staffed by trans people 24/7.   LGBT youth <24 contemplating suicide, call the [employer] [phone].    POISON CONTROL CENTER  [phone]    OR  go to nearest ER    CHILD  \"[facility] has a needs assessment team who will do an intake evaluation. Based on the results of the intake they will recommended inpatient admission, partial hospitalization, intensive outpatient or outpatient care. The number is [phone]. \"    CRISIS TEXT LINE  Http://www.crisistextline.org  FREE SUPPORT AT YOUR FINGERTIPS, 24/7  Crisis Text Line serves anyone, in any type of crisis, providing access to free, 24/7 support and information via the medium people already use and trust: text. Here s how it works:  1)  Text [phone] from anywhere in the [place], anytime, about any type of crisis.  2)  A live, trained Crisis Counselor receives the text and responds quickly.      The volunteer Crisis Counselor will help you move from a 'hot moment to a cool " "moment'    [facility] EMERGENCY NUMBERS    Crisis Connection:                                [phone]  [facility]:              [phone]  Crisis Intervention:                                [phone] or [phone]   COPE: Mobile Team 24hrs/7days:    [phone]  (Adults > 17yo)                                                                            [phone]  (Child   < 18yo)  [facility]        [phone]  CALL 24 hours a day.  [address] [place] [address]  DROP IN:  M-F: 8:00 am - 7:00 pm  Sat: 11:00 am - 3:00 pm   Sun: Closed    WALK-IN COUNSELIN)  [facility]                                  [phone]        [place], [address]:                  DE, W:      5:00-7:00PM       2)  [facility]                            [phone]        [place], [address]                T, Th:      6:00-8:00PM    TRANS and LGBT  Call [employer] at [phone]. This service is staffed by trans people .   LGBT youth <24 contemplating suicide, call the [employer] [phone].    POISON CONTROL CENTER  [phone]    OR  go to nearest ER    CHILD  \"[facility] has a needs assessment team who will do an intake evaluation. Based on the results of the intake they will recommended inpatient admission, partial hospitalization, intensive outpatient or outpatient care. The number is [phone] or [phone]. \"    CRISIS TEXT LINE  Http://www.crisistextline.org  FREE SUPPORT AT YOUR FINGERTIPS,   Crisis Text Line serves anyone, in any type of crisis, providing access to free,  support and information via the medium people already use and trust: text. Here s how it works:  1)  Text [phone] from anywhere in the [place], anytime, about any type of crisis.  2)  A live, trained Crisis Counselor receives the text and responds quickly.      The volunteer Crisis Counselor will help you move from a 'hot " moment to a cool moment'      * A Community Paramedic (CP) is an advanced paramedic that works to increase access to primary and preventive care and decrease use of emergency departments, which in turn decreases health care costs. Among other things, CPs may play a key role in providing follow-up services after a hospital discharge to prevent hospital readmission. CPs can provide health assessments, chronic disease monitoring and education, medication management, immunizations and vaccinations, laboratory specimen collection, hospital discharge follow-up care and minor medical procedures. CPs work under the direction of an Ambulance Medical Director.

## 2022-11-18 NOTE — PROGRESS NOTES
" [facility]  [address], [address]  [place] [address]  New Patient Evaluation       Oscar Dunn is a 32 year old male who prefers the name *** and pronoun {pronoun:884288}.  Psychiatrist: Montrell Benson at [facility]  Therapist: Cassie Mejia  PCP: Marcus Reid  Referred by self for evaluation of OCD.     History was provided by {PATIENT. FAMILY:902182} who was a {h:480783} historian.      Chief Complaint                                                                                                        \" *** \"     History of Present Illness                                                                                4, 4      Pertinent Background and Present Symptoms:    Primary obsessions:   ***  - contamination, vomiting, blood borne illness, health, getting into trouble, escaping from vehicles, safety in unfamiliar places.   - \"flavor of the week\" obsessions.   - YBOCS not completed d/t concern of triggering.     Primary compulsive behaviors:  ***  Rituals?     Exposure work:  ***  [facility] virtual outpatient treatment in 3/2020-5/2020. [facility] in 2001/2002 at [facility] with limited benefits (attended for 1 week). Did not like ERP. CBT and DBT helpful. Ongoing individual psychotherapy helpful.       Psychiatric Review of Symptoms:   Depression: Positive for depressive symptoms, sadness , irritability, anhedonia, social isolation, loss of appetite, increased appetite, insomnia, hypersomnia, psychomotor agitation, psychomotor retardation, fatigue, feeling worthless, guilt, poor concentration, indecisiveness, passive thoughts of death, suicidal ideation without intent or plan to act and weight change  ***.   Brittney: Negative***   Psychosis: Negative   Homicidal Ideation/Violence/Aggression: Negative    Recent Substance Use:  Alcohol- 4 beers per week  Tobacco- Never used   Caffeine- 1-2 cups of coffee daily  Opioids- no      Narcan Kit- N/A   Cannabis- no    Other Illicit " "Drugs-none           Substance Use History   Past Use- Alcohol- {n:086804} , Tobacco- no , Caffeine- coffee/ tea [1-2 cups per day], Opioids- {n:442590}   Cannabis- yes ***  and Other Illicit Drugs-none   Treatment [#, most recent]- None    Medical Consequences [withdrawal, sz etc]- None   HIV/Hepatitis- SIB- {NONE:036108::\"None\"}    Legal Consequences- None       Psychiatric History     Suicidal ideation- Intermittent passive SI  Suicide Attempt:   #- 0   Most Recent- N/A  SIB- None   Psych Hosp- None        Past diagnoses:  MDD- onset age 15-16. Started medication (fluoxetine) at age 19.   OCD- symptoms since age 7/8, diagnosed at age 18-19.   HERMELINDO-  Anxiety dx in 4/2016, ***  Trauma: *** History of traumatic experiences   Violence/Aggression- None   Psych Hosp- None, 1x ED visit in 1/2022 for ***  ECT- None   TMS - 5/2022 to 7/2022 at [facility] with improvement in OCD symptoms. 41 applications total  No hx of VNS, ketamine or light therapy history     Psychiatric Medication Trials   Adequate dose and duration  Fluoxetine 80 mg, 2009 to current, fatigue  Clonazepam up to 1.5 mg, 2009 to current  Fluvoxamine 300 mg, 7/2021 to 12/2021, ineffective         Limited by side effects        Inadequate dose/duration        Information uncertain  Citalopram ***  Alprazolam ***, for flight anxiety  Lorazepam ***, used PRN  Aripiprazole 2 mg in 2/2019-8/2019, why stopped?   Augmentation  Olanzapine 5 mg, 01/2020 to 2021, lack of efficacy and weight gain  Risperidone 1 mg, 04/2022 to 05/2022, tiredness  Other  No history of SNRIs, Serotonin modulators and stimulators, NDRIs, TCAs, Tetracyclic antidepressants, MAOIs, stimulants, Ketamine     Social/ Family History               [per patient report]                                                 1ea, 1ea   FINANCIAL SUPPORT- Family Support and ***.  Prior  at [employer], [employer]  and . Currently unemployed  RELATIONSHIPS/CHILDREN- " "{NONE:420176::\"None\"}       LIVING SITUATION- Lives alone in [place], [place] in a *** . Stable housing.   LEGAL- {NONE:165192::\"None\"}  EARLY HISTORY/ EDUCATION- *** degree in ***.   SOCIAL/ SPIRITUAL SUPPORT- Close with parents and friends that are supportive. Walks often, lifts weights.  Enjoys reading, hiking, learning, going out.    CULTURAL INFLUENCES/ IMPACT- Identifies as white. No cultural considerations for treatment reported.   TRAUMA HISTORY (self-report)- {NONE:901980::\"None\"} Several \"small t traumas\" contributing to hypervigilance and anxiety  FAMILY HISTORY-   Father: \"OCD tendencies\" but no dx OCD.   Paternal cousin: Possible OCD  Younger brother: Seizure disorder  Anxiety and depression on maternal and paternal sides ***   Medical / Surgical History   1. GERD  2. History of gastric ulcers     Denies history of seizure or TBI w/LOC     Medical Review of Systems                                                                                                 2, 10     GENERAL: {ROS-GENERAL:551050}  HEENT: {ROS -  HEENT:122661}  NECK: {ROS -NECK:471261}  RESPIRATORY: {ROS - RESPIRATORY:971771}  CARDIOVASCULAR: {ROS-CARDIAC:233113}  GI: {ROS- GI:021319}  : {ROS- :316238}  GYN: {ROS- GYN:706662}.  LMP: ***.  MUSCULOSKELETAL: {MUSCULOSKELETAL INTMED ROS:706686}  SKIN: {SKIN INT MED ROS:080456}  PSYCH: {PSYCHIATRIC INT MED ROS:231702}  HEMATOLOGY/LYMPHOLOGY {HEMATOLOGY/LYMPHATIC/IMMUNOLOGY INT MED ROS:174849}  ENDOCRINE: {ENDOCRINE INT MED ROS:747119}  NEURO:  {ROS - NEURO:934518}      Metals Screen   Yes No Item     Implanted or lodged metals in body     Implanted surgical devices     Metal containing facial or scalp tattoos     Non removable piercings   Seizure Screen  Yes No Item     Current Seizure Disorder?     History of Seizure?     Does patient have a cochlear implant? __________  Does patient have any shunts?___________  Does patient have a pacemaker?__________  Does patient have a vagus nerve " stimulator?__________  Does patient have a deep brain stimulator?__________  Any other implanted device?________________       Allergy   Patient has no known allergies.     Current Medications     Current Outpatient Medications   Medication Sig Dispense Refill     clonazePAM (KLONOPIN) 0.5 MG tablet as needed       FLUoxetine (PROZAC) 40 MG capsule Take 80 mg by mouth daily       MULTIPLE VITAMINS PO           Vitals                                                                                                                        3, 3     There were no vitals taken for this visit.      Mental Status Exam                                                                                   9, 14 cog gs     Alertness: {a:732932}  Appearance: {a:137449}  Behavior/Demeanor: {b:319610}, with {Desc; good/fair/poor:526567} eye contact   Speech: {s:951029}  Language: {l:140448}  Psychomotor: {p:816639}  Mood: {m:531908}  Affect: {a:883683}; {was:429755} congruent to mood; {was:885125} congruent to content  Thought Process/Associations: {t:161214}  Thought Content:  Reports {t:861362};  Denies {t:339817}  Perception:  Reports {p:130620};  Denies {p:858042}  Insight: {i:129267}  Judgment: {j:203512}  Cognition: (6) {co}  Gait and Station: {UNREMARKABLE refresh:524799}     Labs and Data     Rating Scales:    {PSYCHRATINGSCALES:571886}    YBOCS Today: ***  PHQ9 Today:  ***  PHQ 10/12/2022   PHQ-9 Total Score 8   Q9: Thoughts of better off dead/self-harm past 2 weeks Not at all         No lab results found.  No lab results found.    Diagnosis and Assessment                                                                             m2, h3     Oscar Dunn is a 32 year old male with previous diagnosis of OCD, ***MDD, ***and*** who presents for a diagnostic opinion and discussion of therapeutic options.   Diagnostically, ***    Regarding medication trials, He has tried multiple serotonergic medications, ***in combination  "with dopamine antagonists.  ***The doses and durations of these trials appear to have been adequate, including trials of medications at highest tolerable doses.  ***It appears that the pharmacologic strategies have not been fully explored. ***    Exposure therapy ***appears to have been conducted by a well trained therapist and he appears to have been appropriately engaged.  ***does not appear to have been an adequate \"dose\"/duration trial, limited by ***.     ***Based on the above, neuromodulation or neurosurgical therapies are not indicated at this time. They might be an appropriate choice if some of the above items were tried and did not produce adequate clinical response, and I would be happy to see him back at that point.  ***Based on a YBOCS  > 19, plus adequate medication trials and documented ability to engage in exposure therapy, rTMS with the H7 coil is a reasonable next therapeutic step. ***  ***Based on a YBOCS >28, plus trials of multiple pharmacologic approaches (including augmentations), and with a clearly adequate trial of exposure and response prevention, he meets clinical criteria for surgical treatment (deep brain stimulation or cingulotomy). ***    The risks, benefits, alternatives and potential adverse effects of the above have been explained and are understood by the patient. Our next steps are ***.  Oscar Dunn agrees to the treatment plan with the ability to do so. The pt knows to call the clinic for non-acute problems, but to access emergency resources in case of a crisis.  Medical considerations relevant to treatment are: ***  Substance concerns relevant to treatment are: ***    During the course of these treatments, the patient will be asked not to make any medication changes. After treatment is complete, the patient will transfer back to the referring provider.     Suicide Risk Assessment:  Today Oscar Dunn reports ***. In addition, he has notable risk factors for self-harm, including " {Suicide Risk Factors:128050}. However, risk is mitigated by {Suicide Protective Factors:217432}. Therefore, based on all available evidence including the factors cited above, he does not appear to be at imminent risk for self-harm, does not meet criteria for a 72-hr hold, and therefore involuntary hospitalization will not be pursued at this  time. However, based on degree of symptoms, voluntary referral for {Psychiatry Levels of Care:880236} was recommended, he {ACCEPTED/DECLINED:752641} this offer. Additional steps taken to minimize risk include: ***      {PSYPMP:156487}    {PSYDRUGINT:392774}    Plan                                                                                                                     m2, h3       1) Obsessive-compulsive disorder  -- Medications: ***Continue current outpatient psychotropic medications  ***  -- Psychotherapy: ***Continue regular individual psychotherapy   ***    -- Procedures:    - ***  -- Referrals: None  {PSYTX:347043}    2) ***  {PSYTX:925797}    3) ***  {PSYTX:391177}    RTC: ***        CRISIS NUMBERS:   Provided routinely in AVS.    Treatment Risk Statement:  The patient understands the risks, benefits, adverse effects and alternatives. Agrees to treatment with the capacity to do so. No medical contraindications to treatment. Agrees to call clinic for any problems. The patient understands to call 911 or go to the nearest ED if life threatening or urgent symptoms occur.        PROVIDER:  Nish Roberson MD    Time based billing.  On the day of the visit:  60 minutes spent reviewing chart, past notes, prior treatments.  60 minutes spent face to face with patient.  30 minutes spent preparing consult note, additional communications.  ***    {AMBULATORY ATTENDING and RESIDENT/FELLOW Attestation Options:[id number]}